# Patient Record
Sex: FEMALE | Race: WHITE | NOT HISPANIC OR LATINO | Employment: OTHER | URBAN - METROPOLITAN AREA
[De-identification: names, ages, dates, MRNs, and addresses within clinical notes are randomized per-mention and may not be internally consistent; named-entity substitution may affect disease eponyms.]

---

## 2021-09-17 ENCOUNTER — HOSPITAL ENCOUNTER (OUTPATIENT)
Dept: MAMMOGRAPHY | Facility: CLINIC | Age: 61
Discharge: HOME | End: 2021-09-17
Admitting: INTERNAL MEDICINE

## 2021-09-17 DIAGNOSIS — Z12.31 VISIT FOR SCREENING MAMMOGRAM: ICD-10-CM

## 2021-09-17 PROCEDURE — 77063 BREAST TOMOSYNTHESIS BI: CPT

## 2021-10-23 ENCOUNTER — HEALTH MAINTENANCE LETTER (OUTPATIENT)
Age: 61
End: 2021-10-23

## 2022-10-10 ENCOUNTER — HEALTH MAINTENANCE LETTER (OUTPATIENT)
Age: 62
End: 2022-10-10

## 2022-11-27 ENCOUNTER — HEALTH MAINTENANCE LETTER (OUTPATIENT)
Age: 62
End: 2022-11-27

## 2023-07-13 ENCOUNTER — OFFICE VISIT (OUTPATIENT)
Dept: FAMILY MEDICINE | Facility: CLINIC | Age: 63
End: 2023-07-13

## 2023-07-13 VITALS
WEIGHT: 118.8 LBS | BODY MASS INDEX: 21.86 KG/M2 | HEIGHT: 62 IN | SYSTOLIC BLOOD PRESSURE: 130 MMHG | DIASTOLIC BLOOD PRESSURE: 86 MMHG | OXYGEN SATURATION: 100 % | HEART RATE: 69 BPM

## 2023-07-13 DIAGNOSIS — E03.9 ACQUIRED HYPOTHYROIDISM: ICD-10-CM

## 2023-07-13 DIAGNOSIS — K51.80 OTHER ULCERATIVE COLITIS WITHOUT COMPLICATION (H): ICD-10-CM

## 2023-07-13 DIAGNOSIS — H93.13 TINNITUS, BILATERAL: Primary | ICD-10-CM

## 2023-07-13 DIAGNOSIS — L82.1 SEBORRHEIC KERATOSES: ICD-10-CM

## 2023-07-13 PROBLEM — K51.30 ULCERATIVE RECTOSIGMOIDITIS WITHOUT COMPLICATION (H): Status: RESOLVED | Noted: 2023-07-13 | Resolved: 2023-07-13

## 2023-07-13 PROBLEM — K51.90 ULCERATIVE COLITIS (H): Status: ACTIVE | Noted: 2023-07-13

## 2023-07-13 PROBLEM — K51.30 ULCERATIVE RECTOSIGMOIDITIS WITHOUT COMPLICATION (H): Status: ACTIVE | Noted: 2023-07-13

## 2023-07-13 PROBLEM — F40.243 FLYING PHOBIA: Status: ACTIVE | Noted: 2018-07-10

## 2023-07-13 PROCEDURE — 99203 OFFICE O/P NEW LOW 30 MIN: CPT | Performed by: FAMILY MEDICINE

## 2023-07-13 PROCEDURE — 36415 COLL VENOUS BLD VENIPUNCTURE: CPT | Performed by: FAMILY MEDICINE

## 2023-07-13 RX ORDER — VITAMIN B COMPLEX
4000 TABLET ORAL DAILY
COMMUNITY

## 2023-07-13 RX ORDER — LEVOTHYROXINE SODIUM 112 UG/1
TABLET ORAL
COMMUNITY
Start: 2022-08-22 | End: 2023-07-19

## 2023-07-13 NOTE — PROGRESS NOTES
"SUBJECTIVE:    Jane Vazquez, is a 63 year old female presenting for the below:     1. Hypothyroidism : Last TSH slightly low with higher T3. Synthroid 125 mcg daily for 1 month (dropped from 137 mcg prior). Tends to guide own labs (self pays for labs).  Highest dose 175 mcg. Dx at age 23.     2. Bilateral tinnitus : initially intermittent. 2 month, constant.  has commended on loss of hearing. 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     3. Skin concern : raised waxy lesion to left anterior shoulder    4. Ulcerative colitis. Receive treatment via functional medicine provider in [place] : received treatment for bacterial overgrowth with good effect. Has previously seen by  Dr Aram GUERRA. Aware overdue colonscopy and plans to do this with Aram GUERRA in the Fall.     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 Lived in [place] for 37 years. Enjoys walking.        OB/Gyn History:    Last Pap Smear: s/p hysterectomy.       OBJECTIVE:  Vitals:    07/13/23 0911   BP: 130/86   Pulse: 69   SpO2: 100%   Weight: 53.9 kg (118 lb 12.8 oz)   Height: 1.562 m (5' 1.5\")    Body mass index is 22.08 kg/m .  General: no acute distress, cooperative with exam.  Skin : raised circular lesion to left anterior shoulder. approx 4 mm in diameter. Waxy stuck on appearence.     ASSESSMENT / PLAN:      Tinnitus, bilateral  Proceed to formal audiology.  -     Adult Audiology  Referral - To [facility] Location; Future    Acquired hypothyroidism   Last TSH slightly low with higher T3. Synthroid 125 mcg daily for 1 month (dropped from 137 mcg prior). Due recheck.   -     TSH ([facility])  -     VENOUS COLLECTION    Seborrheic keratoses  Benign nature of lesion to left anterior shoulder discussed.     Other ulcerative colitis without complication (H)  Has previously seen by  Dr Aram GUERRA. Aware overdue colonscopy and plans to do this with Aram GUERRA " in the Fall.

## 2023-07-14 ENCOUNTER — TRANSFERRED RECORDS (OUTPATIENT)
Dept: FAMILY MEDICINE | Facility: CLINIC | Age: 63
End: 2023-07-14

## 2023-07-14 LAB — TSH BLD-ACNC: 1.23 UIU/ML (ref 0.45–4.5)

## 2023-07-19 DIAGNOSIS — E03.9 ACQUIRED HYPOTHYROIDISM: Primary | ICD-10-CM

## 2023-07-19 RX ORDER — LEVOTHYROXINE SODIUM 112 UG/1
TABLET ORAL
Qty: 30 TABLET | Refills: 1 | Status: SHIPPED | OUTPATIENT
Start: 2023-07-19 | End: 2023-09-29

## 2023-08-02 ENCOUNTER — TRANSFERRED RECORDS (OUTPATIENT)
Dept: FAMILY MEDICINE | Facility: CLINIC | Age: 63
End: 2023-08-02

## 2023-09-29 DIAGNOSIS — E03.9 ACQUIRED HYPOTHYROIDISM: Primary | ICD-10-CM

## 2023-09-29 RX ORDER — LEVOTHYROXINE SODIUM 125 UG/1
TABLET ORAL
Qty: 90 TABLET | Refills: 1 | Status: SHIPPED | OUTPATIENT
Start: 2023-09-29 | End: 2024-05-30

## 2023-09-29 NOTE — TELEPHONE ENCOUNTER
*LVM for pt clarification- refill request from pharm for Levothyroxine 112mcg but chart/note says pt has been taking 125mcg and should continue that dose. Need to clarify which dose pt taking/needs refilled. Refilled 112mcg dose on 7/19/23.    Patient returned call and says needs Levothyroxine 125mcg. LOV note mentions 125mcg dose but not listed on med list. Will pend refill for 125mcg  Mercedez Jiménez CMA on 9/29/2023 at 10:27 AM      Med: Levothyroxine     LOV (related): 7/13/23 with Dr. Caldwell     Last Lab: 7/13/23    Due for F/U around: not specified- due for cpx     Next Appt: None    TSH ([facility])  Dx: Acquired hypothyroidism            Component Ref Range & Units 2 mo ago    TSH 0.450 - 4.500 uIU/mL 1.230

## 2023-11-27 ENCOUNTER — OFFICE VISIT (OUTPATIENT)
Dept: FAMILY MEDICINE | Facility: CLINIC | Age: 63
End: 2023-11-27

## 2023-11-27 VITALS
HEART RATE: 69 BPM | DIASTOLIC BLOOD PRESSURE: 80 MMHG | BODY MASS INDEX: 22.05 KG/M2 | OXYGEN SATURATION: 100 % | WEIGHT: 118.6 LBS | SYSTOLIC BLOOD PRESSURE: 150 MMHG

## 2023-11-27 DIAGNOSIS — F40.243 FLYING PHOBIA: Primary | ICD-10-CM

## 2023-11-27 PROCEDURE — 99213 OFFICE O/P EST LOW 20 MIN: CPT | Performed by: FAMILY MEDICINE

## 2023-11-27 RX ORDER — LORAZEPAM 0.5 MG/1
0.5 TABLET ORAL DAILY PRN
Qty: 10 TABLET | Refills: 0 | Status: SHIPPED | OUTPATIENT
Start: 2023-11-27 | End: 2024-08-30

## 2023-11-27 ASSESSMENT — ANXIETY QUESTIONNAIRES
5. BEING SO RESTLESS THAT IT IS HARD TO SIT STILL: NOT AT ALL
1. FEELING NERVOUS, ANXIOUS, OR ON EDGE: NOT AT ALL
6. BECOMING EASILY ANNOYED OR IRRITABLE: NOT AT ALL
2. NOT BEING ABLE TO STOP OR CONTROL WORRYING: NOT AT ALL
GAD7 TOTAL SCORE: 0
GAD7 TOTAL SCORE: 0
3. WORRYING TOO MUCH ABOUT DIFFERENT THINGS: NOT AT ALL
7. FEELING AFRAID AS IF SOMETHING AWFUL MIGHT HAPPEN: NOT AT ALL

## 2023-11-27 ASSESSMENT — PATIENT HEALTH QUESTIONNAIRE - PHQ9
SUM OF ALL RESPONSES TO PHQ QUESTIONS 1-9: 0
5. POOR APPETITE OR OVEREATING: NOT AT ALL

## 2023-11-27 NOTE — PROGRESS NOTES
Subjective      is a 63 year old patient who presents to clinic for evaluation.  She has a flying phobia.  Historically has taken lorazepam only when she flies.  Has not used recently.  Her daughter has severe anxiety and was in the ER last night and she thinks this is why her BP is elevated.  She has no other concerns.        Review of Systems   Constitutional, HEENT, cardiovascular, pulmonary, GI, , musculoskeletal, neuro, skin, endocrine and psych systems are negative, except as otherwise noted.      Objective    BP (!) 150/80   Pulse 69   Wt 53.8 kg (118 lb 9.6 oz)   SpO2 100%   BMI 22.05 kg/m      General: Well appearing, NAD  Psych: normal mood and affect        No results found for this or any previous visit (from the past 24 hour(s)).    Flying phobia  Refill given. Proper use and potential benefits, harms and side effects discussed in detail.  PDMP reviewed.  Controlled substance agreement signed.    PDMP Review         Value Time User    State PDMP site checked  Yes 11/27/2023  3:56 PM Zbigniew Figueroa MD            - LORazepam (ATIVAN) 0.5 MG tablet; Take 1 tablet (0.5 mg) by mouth daily as needed for anxiety (flying)              Answers submitted by the patient for this visit:  General Questionnaire (Submitted on 11/27/2023)  Chief Complaint: Chronic problems general questions HPI Form  What is the reason for your visit today? : Need meds for flying possible thyroid check  How many servings of fruits and vegetables do you eat daily?: 2-3  On average, how many sweetened beverages do you drink each day (Examples: soda, juice, sweet tea, etc.  Do NOT count diet or artificially sweetened beverages)?: 0  How many minutes a day do you exercise enough to make your heart beat faster?: 30 to 60  How many days a week do you exercise enough to make your heart beat faster?: 6  How many days per week do you miss taking your medication?: 0

## 2023-11-27 NOTE — LETTER
[facility]  11/27/23  Patient: Jane Vazquez  YOB: 1960  Medical Record Number: [id number]                                                                                  Non-Opioid Controlled Substance Agreement    This is an agreement between you and your provider regarding safe and appropriate use of controlled substances prescribed by your care team. Controlled substances are?medicines that can cause physical and mental dependence (abuse).     There are strict laws about having and using these medicines. We here at [facility] are  committed to working with you in your efforts to get better. To support you in this work, we'll help you schedule regular office appointments for medicine refills. If we must cancel or change your appointment for any reason, we'll make sure you have enough medicine to last until your next appointment.     As a Provider, I will:   Listen carefully to your concerns while treating you with respect.   Recommend a treatment plan that I believe is in your best interest and may involve therapies other than medicine.    Talk with you often about the possible benefits and the risk of harm of any medicine that we prescribe for you.  Assess the safety of this medicine and check how well it works.    Provide a plan on how to taper (discontinue or go off) using this medicine if the decision is made to stop its use.      ::  As a Patient, I understand controlled substances:     Are prescribed by my care provider to help me function or work and manage my condition(s).?  Are strong medicines and can cause serious side effects.     Need to be taken exactly as prescribed.?Combining controlled substances with certain medicines or chemicals (such as illegal drugs, alcohol, sedatives, sleeping pills, and benzodiazepines) can be dangerous or even fatal.? If I stop taking my medicines suddenly, I may have severe withdrawal symptoms.     The risks, benefits, and side effects  of these medicine(s) were explained to me. I agree that:    I will take part in other treatments as advised by my care team. This may be psychiatry or counseling, physical therapy, behavioral therapy, group treatment or a referral to specialist.    I will keep all my appointments and understand this is part of the monitoring of controlled substances.?My care team may require an office visit for EVERY controlled substance refill. If I miss appointments or don t follow instructions, my care team may stop my medicine    I will take my medicines as prescribed. I will not change the dose or schedule unless my care team tells me to. There will be no refills if I run out early.      I may be asked to come to the clinic and complete a urine drug test or complete a pill count. If I don t give a urine sample or participate in a pill count, the care team may stop my medicine.    I will only receive controlled substance prescriptions from this clinic. If I am treated by another provider, I will tell them that I am taking controlled substances and that I have a treatment agreement with this provider. I will inform my [facility] care team within one business day if I am given a prescription for any controlled substance by another healthcare provider. My [facility] care team can contact other providers and pharmacists about my use of any medicines.    It is up to me to make sure that I don't run out of my medicines on weekends or holidays.?If my care team is willing to refill my prescription without a visit, I must request refills only during office hours. Refills may take up to 3 business days to process. I will use one pharmacy to fill all my controlled substance prescriptions. I will notify the clinic about any changes to my insurance or medicine availability.    I am responsible for my prescriptions. If the medicine/prescription is lost, stolen or destroyed, it will not be replaced.?I also agree not to share  controlled substance medicines with anyone.     I am aware I should not use any illegal or recreational drugs. I agree not to drink alcohol unless my care team says I can.     If I enroll in the [place] Medical Cannabis program, I will tell my care team before my next refill.    I will tell my care team right away if I become pregnant, have a new medical problem treated outside of my regular clinic, or have a change in my medicines.     I understand that this medicine can affect my thinking, judgment and reaction time.? Alcohol and drugs affect the brain and body, which can affect the safety of my driving. Being under the influence of alcohol or drugs can affect my decision-making, behaviors, personal safety and the safety of others. Driving while impaired (DWI) can occur if a person is driving, operating or in physical control of a car, motorcycle, boat, snowmobile, ATV, motorbike, off-road vehicle or any other motor vehicle ([place] Statute 169A.20). I understand the risk if I choose to drive or operate any vehicle or machinery.    I understand that if I do not follow any of the conditions above, my prescriptions or treatment may be stopped or changed.   I agree that my provider, clinic care team and pharmacy may work with any city, state or federal law enforcement agency that investigates the misuse, sale or other diversion of my controlled medicine. I will allow my provider to discuss my care with, or share a copy of, this agreement with any other treating provider, pharmacy or emergency room where I receive care.     I have read this agreement and have asked questions about anything I did not understand.    ________________________________________________________  Patient Signature - Jane Vazquez     ___________________                   Date     ________________________________________________________  Provider Signature - Zbigniew Figueroa MD       ___________________                   Date      ________________________________________________________  Witness Signature (required if provider not present while patient signing)          ___________________                   Date

## 2023-12-21 ENCOUNTER — HOSPITAL ENCOUNTER (OUTPATIENT)
Dept: CT IMAGING | Facility: CLINIC | Age: 63
Discharge: HOME | End: 2023-12-21
Attending: FAMILY MEDICINE | Admitting: FAMILY MEDICINE

## 2023-12-21 ENCOUNTER — OFFICE VISIT (OUTPATIENT)
Dept: FAMILY MEDICINE | Facility: CLINIC | Age: 63
End: 2023-12-21

## 2023-12-21 VITALS
BODY MASS INDEX: 21.67 KG/M2 | HEART RATE: 66 BPM | OXYGEN SATURATION: 100 % | WEIGHT: 116.6 LBS | SYSTOLIC BLOOD PRESSURE: 152 MMHG | TEMPERATURE: 97.9 F | DIASTOLIC BLOOD PRESSURE: 72 MMHG

## 2023-12-21 DIAGNOSIS — R10.32 LEFT LOWER QUADRANT PAIN: Primary | ICD-10-CM

## 2023-12-21 DIAGNOSIS — K51.80 OTHER ULCERATIVE COLITIS WITHOUT COMPLICATION (H): ICD-10-CM

## 2023-12-21 DIAGNOSIS — R10.32 LEFT LOWER QUADRANT PAIN: ICD-10-CM

## 2023-12-21 LAB
% GRANULOCYTES: 64.9 % (ref 42.2–75.2)
ANION GAP SERPL CALCULATED.3IONS-SCNC: 10 MMOL/L (ref 7–15)
BUN SERPL-MCNC: 17.9 MG/DL (ref 8–23)
CALCIUM SERPL-MCNC: 10 MG/DL (ref 8.8–10.2)
CHLORIDE SERPL-SCNC: 105 MMOL/L (ref 98–107)
CREAT SERPL-MCNC: 0.86 MG/DL (ref 0.51–0.95)
DEPRECATED HCO3 PLAS-SCNC: 29 MMOL/L (ref 22–29)
EGFRCR SERPLBLD CKD-EPI 2021: 75 ML/MIN/1.73M2
GLUCOSE SERPL-MCNC: 98 MG/DL (ref 70–99)
HCT VFR BLD AUTO: 39.5 % (ref 35–46)
HEMOGLOBIN: 13.6 G/DL (ref 11.8–15.5)
LYMPHOCYTES NFR BLD AUTO: 27.6 % (ref 20.5–51.1)
MCH RBC QN AUTO: 31.5 PG (ref 27–31)
MCHC RBC AUTO-ENTMCNC: 34.4 G/DL (ref 33–37)
MCV RBC AUTO: 91.5 FL (ref 80–100)
MONOCYTES NFR BLD AUTO: 7.5 % (ref 1.7–9.3)
PLATELET # BLD AUTO: 245 K/UL (ref 140–450)
POTASSIUM SERPL-SCNC: 4.1 MMOL/L (ref 3.4–5.3)
RBC # BLD AUTO: 4.31 X10/CMM (ref 3.7–5.2)
SODIUM SERPL-SCNC: 144 MMOL/L (ref 135–145)
WBC # BLD AUTO: 4.8 X10/CMM (ref 3.8–11)

## 2023-12-21 PROCEDURE — 36415 COLL VENOUS BLD VENIPUNCTURE: CPT | Performed by: FAMILY MEDICINE

## 2023-12-21 PROCEDURE — 85025 COMPLETE CBC W/AUTO DIFF WBC: CPT | Performed by: FAMILY MEDICINE

## 2023-12-21 PROCEDURE — 80048 BASIC METABOLIC PNL TOTAL CA: CPT | Performed by: FAMILY MEDICINE

## 2023-12-21 PROCEDURE — 74177 CT ABD & PELVIS W/CONTRAST: CPT

## 2023-12-21 PROCEDURE — 99214 OFFICE O/P EST MOD 30 MIN: CPT | Performed by: FAMILY MEDICINE

## 2023-12-21 PROCEDURE — 250N000009 HC RX 250: Performed by: FAMILY MEDICINE

## 2023-12-21 PROCEDURE — 250N000011 HC RX IP 250 OP 636: Performed by: FAMILY MEDICINE

## 2023-12-21 RX ORDER — IOPAMIDOL 755 MG/ML
79 INJECTION, SOLUTION INTRAVASCULAR ONCE
Status: COMPLETED | OUTPATIENT
Start: 2023-12-21 | End: 2023-12-21

## 2023-12-21 RX ADMIN — IOPAMIDOL 79 ML: 755 INJECTION, SOLUTION INTRAVENOUS at 17:39

## 2023-12-21 RX ADMIN — SODIUM CHLORIDE 63 ML: 9 INJECTION, SOLUTION INTRAVENOUS at 17:40

## 2023-12-21 NOTE — PROGRESS NOTES
SUBJECTIVE:    Jane Vazquez, is a 63 year old female presenting for the below:     1. 2 week h/o nausea (constant) associated with lower back ache (lower bilateral, constant , 5/10 in severity). Some decreased appetite: eating causing nausea. Eating toast and saltines. Mild unintentional weight loss.   Symptoms initially started shortly after returning from [place] with sore throat for 24 hours and flu like symptoms which has now resolved.     No vomiting. 1 x episode of loose stool last week. No improvement with Mylanta. No fevers or chills. No GERD. No shortness of breath, no chest pain. Denies mucus, hemtochezia or melena.     Known ulcerative colitis. Feels different from prior flares. Has been in remission for 5 years at least. No urinary symptoms.     OBJECTIVE:  Vitals:    12/21/23 1449   BP: (!) 152/72   Pulse: 66   Temp: 97.9  F (36.6  C)   TempSrc: Oral   SpO2: 100%   Weight: 52.9 kg (116 lb 9.6 oz)    Body mass index is 21.67 kg/m .    General: no acute distress, cooperative with exam, slightly tired looking.   Ears: TM's and canals show no erythema, discharge, or effusion bilaterally.  Lungs: clear to auscultation bilaterally, normal respiratory effort.  Heart: regular rate, normal S1 and S2, no murmurs.   Abdomen:  tender to palpation and percussion with guarding left iliac fossa.  no palpable organomegaly. No rebound.       Wt Readings from Last 3 Encounters:   12/21/23 52.9 kg (116 lb 9.6 oz)   11/27/23 53.8 kg (118 lb 9.6 oz)   07/13/23 53.9 kg (118 lb 12.8 oz)     Colonoscopy 6/2020      ASSESSMENT / PLAN:      Left lower quadrant pain  Other ulcerative colitis without complication (H)  Significant findings on abdominal exam. Concern for ulcerative colitis flare vs diverticulitis. Proceed to CT abdomen / pelvis. Read and call result.   -     CBC with Diff/Plt ([facility])  -     Basic metabolic panel; Future  -     CT Abdomen Pelvis w Contrast; Future

## 2023-12-21 NOTE — PROGRESS NOTES
CT abd/pelvis scheduled for today at [facility]. Check in at 510pm for 540pm appt.   Requested stat read and call to Dr. Caldwell's cell number.   Patient informed and verbalizes understanding.   Lydia Boateng RN

## 2024-01-07 ENCOUNTER — HEALTH MAINTENANCE LETTER (OUTPATIENT)
Age: 64
End: 2024-01-07

## 2024-05-10 ENCOUNTER — HOSPITAL ENCOUNTER (OUTPATIENT)
Dept: MAMMOGRAPHY | Facility: CLINIC | Age: 64
Discharge: HOME | End: 2024-05-10
Attending: FAMILY MEDICINE | Admitting: FAMILY MEDICINE
Payer: MEDICARE

## 2024-05-10 DIAGNOSIS — Z12.31 VISIT FOR SCREENING MAMMOGRAM: ICD-10-CM

## 2024-05-10 PROCEDURE — 77063 BREAST TOMOSYNTHESIS BI: CPT

## 2024-05-26 ENCOUNTER — HEALTH MAINTENANCE LETTER (OUTPATIENT)
Age: 64
End: 2024-05-26

## 2024-05-30 DIAGNOSIS — E03.9 ACQUIRED HYPOTHYROIDISM: ICD-10-CM

## 2024-05-30 RX ORDER — LEVOTHYROXINE SODIUM 125 UG/1
TABLET ORAL
Qty: 90 TABLET | Refills: 1 | Status: SHIPPED | OUTPATIENT
Start: 2024-05-30 | End: 2024-08-08

## 2024-05-30 NOTE — TELEPHONE ENCOUNTER
"Med: Synthroid     LOV (related): 7/13/23    Last Lab: 7/13/23      Due for F/U around:  due for CPX      Next Appt: No current future appointments scheduled         No results found for: \"TSH\", \"T4\"    "

## 2024-08-05 SDOH — HEALTH STABILITY: PHYSICAL HEALTH: ON AVERAGE, HOW MANY MINUTES DO YOU ENGAGE IN EXERCISE AT THIS LEVEL?: 40 MIN

## 2024-08-05 SDOH — HEALTH STABILITY: PHYSICAL HEALTH: ON AVERAGE, HOW MANY DAYS PER WEEK DO YOU ENGAGE IN MODERATE TO STRENUOUS EXERCISE (LIKE A BRISK WALK)?: 7 DAYS

## 2024-08-05 ASSESSMENT — SOCIAL DETERMINANTS OF HEALTH (SDOH): HOW OFTEN DO YOU GET TOGETHER WITH FRIENDS OR RELATIVES?: THREE TIMES A WEEK

## 2024-08-06 NOTE — PATIENT INSTRUCTIONS
Patient Education   Preventive Care Advice   This is general advice given by our system to help you stay healthy. However, your care team may have specific advice just for you. Please talk to your care team about your preventive care needs.  Nutrition  Eat 5 or more servings of fruits and vegetables each day.  Try wheat bread, brown rice and whole grain pasta (instead of white bread, rice, and pasta).  Get enough calcium and vitamin D. Check the label on foods and aim for 100% of the RDA (recommended daily allowance).  Lifestyle  Exercise at least 150 minutes each week  (30 minutes a day, 5 days a week).  Do muscle strengthening activities 2 days a week. These help control your weight and prevent disease.  No smoking.  Wear sunscreen to prevent skin cancer.  Have a dental exam and cleaning every 6 months.  Yearly exams  See your health care team every year to talk about:  Any changes in your health.  Any medicines your care team has prescribed.  Preventive care, family planning, and ways to prevent chronic diseases.  Shots (vaccines)   HPV shots (up to age 26), if you've never had them before.  Hepatitis B shots (up to age 59), if you've never had them before.  COVID-19 shot: Get this shot when it's due.  Flu shot: Get a flu shot every year.  Tetanus shot: Get a tetanus shot every 10 years.  Pneumococcal, hepatitis A, and RSV shots: Ask your care team if you need these based on your risk.  Shingles shot (for age 50 and up)  General health tests  Diabetes screening:  Starting at age 35, Get screened for diabetes at least every 3 years.  If you are younger than age 35, ask your care team if you should be screened for diabetes.  Cholesterol test: At age 39, start having a cholesterol test every 5 years, or more often if advised.  Bone density scan (DEXA): At age 50, ask your care team if you should have this scan for osteoporosis (brittle bones).  Hepatitis C: Get tested at least once in your life.  STIs (sexually  transmitted infections)  Before age 24: Ask your care team if you should be screened for STIs.  After age 24: Get screened for STIs if you're at risk. You are at risk for STIs (including HIV) if:  You are sexually active with more than one person.  You don't use condoms every time.  You or a partner was diagnosed with a sexually transmitted infection.  If you are at risk for HIV, ask about PrEP medicine to prevent HIV.  Get tested for HIV at least once in your life, whether you are at risk for HIV or not.  Cancer screening tests  Cervical cancer screening: If you have a cervix, begin getting regular cervical cancer screening tests starting at age 21.  Breast cancer scan (mammogram): If you've ever had breasts, begin having regular mammograms starting at age 40. This is a scan to check for breast cancer.  Colon cancer screening: It is important to start screening for colon cancer at age 45.  Have a colonoscopy test every 10 years (or more often if you're at risk) Or, ask your provider about stool tests like a FIT test every year or Cologuard test every 3 years.  To learn more about your testing options, visit:   .  For help making a decision, visit:   https://bit.ly/fg81745.  Prostate cancer screening test: If you have a prostate, ask your care team if a prostate cancer screening test (PSA) at age 55 is right for you.  Lung cancer screening: If you are a current or former smoker ages 50 to 80, ask your care team if ongoing lung cancer screenings are right for you.  For informational purposes only. Not to replace the advice of your health care provider. Copyright   2023 [employer]. All rights reserved. Clinically reviewed by the [facility] Transitions Program. [employer] 915881 - REV 01/24.

## 2024-08-08 ENCOUNTER — OFFICE VISIT (OUTPATIENT)
Dept: FAMILY MEDICINE | Facility: CLINIC | Age: 64
End: 2024-08-08

## 2024-08-08 VITALS
HEIGHT: 62 IN | HEART RATE: 70 BPM | BODY MASS INDEX: 21.6 KG/M2 | SYSTOLIC BLOOD PRESSURE: 115 MMHG | OXYGEN SATURATION: 100 % | DIASTOLIC BLOOD PRESSURE: 63 MMHG | WEIGHT: 117.4 LBS

## 2024-08-08 DIAGNOSIS — E03.9 ACQUIRED HYPOTHYROIDISM: ICD-10-CM

## 2024-08-08 DIAGNOSIS — E78.2 MIXED HYPERLIPIDEMIA: ICD-10-CM

## 2024-08-08 DIAGNOSIS — K51.80 OTHER ULCERATIVE COLITIS WITHOUT COMPLICATION (H): ICD-10-CM

## 2024-08-08 DIAGNOSIS — Z00.00 ENCOUNTER FOR MEDICARE ANNUAL WELLNESS EXAM: Primary | ICD-10-CM

## 2024-08-08 DIAGNOSIS — Z12.12 SCREENING FOR COLORECTAL CANCER: ICD-10-CM

## 2024-08-08 DIAGNOSIS — Z12.11 SCREENING FOR COLORECTAL CANCER: ICD-10-CM

## 2024-08-08 DIAGNOSIS — R22.1 NECK MASS: ICD-10-CM

## 2024-08-08 DIAGNOSIS — Z78.0 POST-MENOPAUSAL: ICD-10-CM

## 2024-08-08 LAB
CHOLESTEROL: 276 MG/DL (ref 100–199)
FASTING?: YES
HDL (RMG): 80 MG/DL (ref 40–?)
LDL CALCULATED (RMG): 181 MG/DL (ref 0–130)
TRIGLYCERIDES (RMG): 77 MG/DL (ref 0–149)
TSH SERPL DL<=0.005 MIU/L-ACNC: 0.7 UIU/ML (ref 0.3–4.2)

## 2024-08-08 PROCEDURE — 36415 COLL VENOUS BLD VENIPUNCTURE: CPT | Performed by: FAMILY MEDICINE

## 2024-08-08 PROCEDURE — 99214 OFFICE O/P EST MOD 30 MIN: CPT | Mod: 25 | Performed by: FAMILY MEDICINE

## 2024-08-08 PROCEDURE — 80061 LIPID PANEL: CPT | Mod: QW | Performed by: FAMILY MEDICINE

## 2024-08-08 PROCEDURE — 84443 ASSAY THYROID STIM HORMONE: CPT | Mod: 90 | Performed by: FAMILY MEDICINE

## 2024-08-08 PROCEDURE — G0402 INITIAL PREVENTIVE EXAM: HCPCS | Performed by: FAMILY MEDICINE

## 2024-08-08 RX ORDER — LEVOTHYROXINE SODIUM 125 UG/1
TABLET ORAL
Qty: 90 TABLET | Refills: 3 | Status: SHIPPED | OUTPATIENT
Start: 2024-08-08 | End: 2024-09-09

## 2024-08-08 RX ORDER — OMEGA-3S/DHA/EPA/FISH OIL 1000-1400
CAPSULE,DELAYED RELEASE (ENTERIC COATED) ORAL
COMMUNITY

## 2024-08-08 NOTE — PROGRESS NOTES
8/5/2024   General Health   How would you rate your overall physical health? Good   Feel stress (tense, anxious, or unable to sleep) Only a little      (!) STRESS CONCERN      8/5/2024   Nutrition   Diet: Regular (no restrictions)            8/5/2024   Exercise   Days per week of moderate/strenous exercise 7 days   Average minutes spent exercising at this level 40 min            8/5/2024   Social Factors   Frequency of gathering with friends or relatives Three times a week   Worry food won't last until get money to buy more No   Food not last or not have enough money for food? No   Do you have housing? (Housing is defined as stable permanent housing and does not include staying ouside in a car, in a tent, in an abandoned building, in an overnight shelter, or couch-surfing.) Yes   Are you worried about losing your housing? No   Lack of transportation? No   Unable to get utilities (heat,electricity)? No            8/5/2024   Fall Risk   Fallen 2 or more times in the past year? No   Trouble with walking or balance? No             8/5/2024   Activities of Daily Living- Home Safety   Needs help with the following daily activites None of the above   Safety concerns in the home None of the above            8/5/2024   Dental   Dentist two times every year? (!) NO            8/5/2024   Hearing Screening   Hearing concerns? None of the above            8/5/2024   Driving Risk Screening   Patient/family members have concerns about driving No            8/5/2024   General Alertness/Fatigue Screening   Have you been more tired than usual lately? No            8/5/2024   Urinary Incontinence Screening   Bothered by leaking urine in past 6 months No            8/5/2024   TB Screening   Were you born outside of the [place]? No      HEARING SCREEN: Wears hearing aids.      Today's PHQ-2 Score:       8/5/2024     9:33 AM   PHQ-2 ( 1999 [employer])   Q1: Little interest or pleasure in doing things 0   Q2: Feeling down, depressed or  hopeless 0   PHQ-2 Score 0   Q1: Little interest or pleasure in doing things Not at all   Q2: Feeling down, depressed or hopeless Not at all   PHQ-2 Score 0           8/5/2024   Substance Use   Alcohol more than 3/day or more than 7/wk No   Do you have a current opioid prescription? No   How severe/bad is pain from 1 to 10? 0/10 (No Pain)   Do you use any other substances recreationally? No        Social History     Tobacco Use    Smoking status: Never    Smokeless tobacco: Never         8/5/2024   Breast Cancer Screening   Family history of breast, colon, or ovarian cancer? No / Unknown          5/10/2024   LAST FHS-7 RESULTS   1st degree relative breast or ovarian cancer No   Any relative bilateral breast cancer No   Any male have breast cancer No   Any ONE woman have BOTH breast AND ovarian cancer No   Any woman with breast cancer before 50yrs No   2 or more relatives with breast AND/OR ovarian cancer No   2 or more relatives with breast AND/OR bowel cancer No        The following health maintenance items are reviewed in Epic and correct as of today:  Health Maintenance   Topic Date Due    DEXA  Never done    LIPID  Never done    ADVANCE CARE PLANNING  Never done    ZOSTER IMMUNIZATION (1 of 2) Never done    RSV VACCINE (Pregnancy & 60+) (1 - 1-dose 60+ series) Never done    COLORECTAL CANCER SCREENING  06/26/2022    COVID-19 Vaccine (1 - 2023-24 season) Never done    Pneumococcal Vaccine: 65+ Years (1 of 1 - PCV) 04/15/2024    TSH W/FREE T4 REFLEX  07/13/2024    INFLUENZA VACCINE (1) 09/01/2024    DTAP/TDAP/TD IMMUNIZATION (2 - Td or Tdap) 01/30/2025    MEDICARE ANNUAL WELLNESS VISIT  08/08/2025    FALL RISK ASSESSMENT  08/08/2025    MAMMO SCREENING  05/10/2026    GLUCOSE  12/21/2026    PHQ-2 (once per calendar year)  Completed    IPV IMMUNIZATION  Aged Out    HPV IMMUNIZATION  Aged Out    MENINGITIS IMMUNIZATION  Aged Out    RSV MONOCLONAL ANTIBODY  Aged Out    HEPATITIS C SCREENING  Discontinued    HIV  SCREENING  Discontinued     Mini-Cog Assessment:  Mini Cog Assessment  Clock Draw Score: 2 Normal  3 Item Recall: 3 objects recalled  Mini Cog Total Score: 5    Mini-Cog Assessment Score: Mini Cog Total Score: 5/5.         Encounter for Medicare annual wellness exam

## 2024-08-08 NOTE — PROGRESS NOTES
"Female Preventive Health Visit    SUBJECTIVE:    This 64 year old female presents for a routine preventive physical exam.    The patient has the following concerns:     1. Feeling of fullness left side of neck. 1 year. No pain. Fluctuates in size. Non tender.     Patient's medications, allergies, past medical, surgical and family histories were reviewed and updated as appropriate.    OB/Gyn History:    Last Pap Smear: s/p hysterectomy.     Health Maintenance:  Health maintenance alerts were reviewed and updated as appropriate.  Breast cancer screening: UTD  Osteoporosis screening: due  Colorectal cancer screening: over due 2 year follow up       OBJECTIVE:  Vitals:    08/08/24 1329   BP: 115/63   Pulse: 70   SpO2: 100%   Weight: 53.3 kg (117 lb 6.4 oz)   Height: 1.562 m (5' 1.5\")    Body mass index is 21.82 kg/m .  General: no acute distress, cooperative with exam.  HEENT:  PERRLA. Bilateral TM's, external canals, oropharynx normal.    Neck:  Supple, no lymphadenopathy or thyromegaly. Area patient indicates awareness of corresponds to left carotid artery (pulsatile). Slightly more prominent to right side.    Lungs: clear to auscultation bilaterally, normal respiratory effort.  Heart:  RRR without murmurs, rubs or gallops.  Normal S1 and S2.  Abdomen: normal bowel sounds, nontender, no palpable organomegaly.  Skin:  No lesions.  No rashes.  Extremities: warm, perfused, no swelling or edema.  Neuro:  CN II-XII intact, motor & sensory function all intact.    Psych: mental status normal, mood and affect appropriate.        11/27/2023     4:14 PM   PHQ   PHQ-9 Total Score 0   Q9: Thoughts of better off dead/self-harm past 2 weeks Not at all       ASSESSMENT / PLAN:  This 64 year old female presents for a routine preventive physical exam. Preventive health topics discussed including adequate exercise and healthy diet. Return to clinic in one year for preventative exam or sooner with any other concerns.  Other issues " discussed as noted below.      Other ulcerative colitis without complication (H)  Screening for colorectal cancer   Ulcerative colitis. Receive treatment via functional medicine provider in [place] : received treatment for bacterial overgrowth with good effect. Has previously seen by  Dr Aram GUERRA.   -     Colonoscopy Screening  Referral; Future    Acquired hypothyroidism  Synthroid 175 mcg. Dx at age 23.   -     TSH with free T4 reflex; Future  -     VENOUS COLLECTION    Mixed hyperlipidemia  Recent insurance labs with high total cholesterol, non fasting. Would like to recheck today fasting.   -     Lipid Profile ([facility])    Post-menopausal  -     DX Bone Density; Future    Neck mass   Area patient indicates awareness of corresponds to left carotid artery (pulsatile). USS imaging to rule out aneurysmal change.   -     US Carotid Bilateral; Future

## 2024-08-09 ENCOUNTER — HOSPITAL ENCOUNTER (OUTPATIENT)
Dept: ULTRASOUND IMAGING | Facility: CLINIC | Age: 64
Discharge: HOME | End: 2024-08-09
Attending: FAMILY MEDICINE | Admitting: FAMILY MEDICINE
Payer: MEDICARE

## 2024-08-09 DIAGNOSIS — R22.1 NECK MASS: ICD-10-CM

## 2024-08-09 PROCEDURE — 93880 EXTRACRANIAL BILAT STUDY: CPT

## 2024-08-11 PROBLEM — I65.23 BILATERAL CAROTID ARTERY STENOSIS: Status: ACTIVE | Noted: 2024-08-11

## 2024-08-30 DIAGNOSIS — F40.243 FLYING PHOBIA: ICD-10-CM

## 2024-08-30 RX ORDER — LORAZEPAM 0.5 MG/1
0.5 TABLET ORAL DAILY PRN
Qty: 10 TABLET | Refills: 0 | Status: SHIPPED | OUTPATIENT
Start: 2024-08-30

## 2024-08-30 NOTE — TELEPHONE ENCOUNTER
Patient called clinic requesting prescription for lorazepam 0.5mg tab, takes for fear of flying. Last office visit in clinic 8/8/24 for CPX and last prescription 11/23/24 from Dr Figueroa. Prescription entered and routed to Dr Caldwell for review. Jessica DUENAS  fill history below:

## 2024-09-09 ENCOUNTER — OFFICE VISIT (OUTPATIENT)
Dept: FAMILY MEDICINE | Facility: CLINIC | Age: 64
End: 2024-09-09

## 2024-09-09 ENCOUNTER — ANCILLARY PROCEDURE (OUTPATIENT)
Dept: BONE DENSITY | Facility: CLINIC | Age: 64
End: 2024-09-09
Attending: FAMILY MEDICINE
Payer: MEDICARE

## 2024-09-09 VITALS
DIASTOLIC BLOOD PRESSURE: 85 MMHG | WEIGHT: 118 LBS | SYSTOLIC BLOOD PRESSURE: 147 MMHG | OXYGEN SATURATION: 99 % | HEART RATE: 71 BPM | TEMPERATURE: 98.7 F | BODY MASS INDEX: 21.93 KG/M2

## 2024-09-09 DIAGNOSIS — E03.9 ACQUIRED HYPOTHYROIDISM: ICD-10-CM

## 2024-09-09 DIAGNOSIS — J01.00 ACUTE NON-RECURRENT MAXILLARY SINUSITIS: Primary | ICD-10-CM

## 2024-09-09 PROCEDURE — 99213 OFFICE O/P EST LOW 20 MIN: CPT

## 2024-09-09 PROCEDURE — G2211 COMPLEX E/M VISIT ADD ON: HCPCS

## 2024-09-09 PROCEDURE — 77080 DXA BONE DENSITY AXIAL: CPT | Mod: TC | Performed by: PHYSICIAN ASSISTANT

## 2024-09-09 RX ORDER — LEVOTHYROXINE SODIUM 125 UG/1
TABLET ORAL
Qty: 90 TABLET | Refills: 3 | Status: SHIPPED | OUTPATIENT
Start: 2024-09-09

## 2024-09-09 RX ORDER — MOMETASONE FUROATE MONOHYDRATE 50 UG/1
2 SPRAY, METERED NASAL DAILY
Qty: 17 G | Refills: 0 | Status: SHIPPED | OUTPATIENT
Start: 2024-09-09

## 2024-09-09 NOTE — PROGRESS NOTES
Assessment & Plan     Acute non-recurrent maxillary sinusitis  - educated most sinus infections are viral, given not improving despite home remedies and tenderness over sinuses will treat for bacterial infection, did advise patient this could be COVID given recent travel from [place].   - Education about adverse effects of prescription medication discussed  -Patient verbalized understanding and is agreeable to the discussed plan of care.  - Red flags that warrant emergent evaluation discussed    - amoxicillin-clavulanate (AUGMENTIN) 875-125 MG tablet  Dispense: 14 tablet; Refill: 0  - mometasone (NASONEX) 50 MCG/ACT nasal spray  Dispense: 17 g; Refill: 0    Acquired hypothyroidism  - refill provided   - levothyroxine (SYNTHROID/LEVOTHROID) 125 MCG tablet  Dispense: 90 tablet; Refill: 3              See Patient Instructions    Return in about 3 days (around 9/12/2024), or if symptoms worsen or fail to improve, for Follow up.    Subjective    is a 64 year old, presenting for the following health issues:  URI (Sinus congestion for 3 weeks. Sore eyes, ear pressure, headache, cough/Denies SOB, chest pain, body ache, fever, rash/Had sinus infection in Jan)    HPI     1.) sinus complaint: pressure around eyes and in ears with headache. Mild cough.  All started about 3 weeks ago and is staying the same.  She has tried mucinex, nasal rinses, and advil and showering at home for remedy with mild-moderate relief. Also has been taking zyrtec and cetirizine.        Review of Systems  Constitutional, HEENT, cardiovascular, pulmonary, gi and gu systems are negative, except as otherwise noted.      Objective    BP (!) 147/85   Pulse 71   Temp 98.7  F (37.1  C)   Wt 53.5 kg (118 lb)   SpO2 99%   BMI 21.93 kg/m    Body mass index is 21.93 kg/m .  Physical Exam   GENERAL: alert and no distress  EYES: Eyes grossly normal to inspection, PERRL and conjunctivae and sclerae normal  HENT: normal cephalic/atraumatic, both ears:  clear effusion and erythematous, nose and mouth without ulcers or lesions, nasal mucosa edematous , rhinorrhea white, oropharynx clear, and oral mucous membranes moist  NECK: no adenopathy, no asymmetry, masses, or scars  RESP: lungs clear to auscultation - no rales, rhonchi or wheezes  CV: regular rate and rhythm, normal S1 S2, no S3 or S4, no murmur, click or rub, no peripheral edema  MS: no gross musculoskeletal defects noted, no edema  SKIN: no suspicious lesions or rashes    Results for orders placed or performed in visit on 08/21/24 (from the past 24 hour(s))   DX Bone Density    Narrative    EXAM: DX AXIAL HIPS/SPINE  LOCATION: [facility]  DATE: 9/9/2024    INDICATION: BMD screening, baseline.  DEMOGRAPHICS: Age- 64 years. Gender- Female. Menopausal status- Postmenopausal.  COMPARISON: No prior studies available on the current scanner.  TECHNIQUE: Dual-energy x-ray absorptiometry (DXA) performed with routine technique.     FINDINGS:    DXA RESULTS  -Lumbar Spine: L1-L4: BMD: 0.870 g/cm2. T-score: -2.6. Z-score: -1.0.  -RIGHT Hip Total: BMD: 0.820 g/cm2. T-score: -1.5. Z-score: -0.3.  -RIGHT Hip Femoral neck: BMD: 0.830 g/cm2. T-score: -1.5. Z-score: 0.0.  -LEFT Hip Total: BMD: 0.839 g/cm2. T-score: -1.3. Z-score: -0.1.  -LEFT Hip Femoral neck: BMD: 0.772 g/cm2. T-score: -1.9. Z-score: -0.4.    WHO T-SCORE CRITERIA  -Normal: T score at or above -1 SD  -Osteopenia: T score between -1 and -2.5 SD  -Osteoporosis: T score at or below -2.5 SD    The World Health Organization (WHO) criteria is applicable to perimenopausal females, postmenopausal females, and men aged 50 years or older.    FRACTURE RISK  -The FRAX risk calculator is not applicable due to osteoporosis.    RECOMMENDATIONS  The patient's BMD is consistent with osteoporosis, and he/she is at increased fracture risk. If not currently being treated for low BMD, this would merit treatment according to the Bone Health and  Osteoporosis Foundation.      Impression    IMPRESSION: OSTEOPOROSIS. T score meets the WHO criteria for osteoporosis at one or more measured sites. The risk of osteoporotic fracture increases approximately two-fold for each standard deviation decrease in T-score.            Signed Electronically by: DOMINGUEZ Kramer CNP    The longitudinal plan of care for the diagnosis(es)/condition(s) as documented were addressed during this visit. Due to the added complexity in care, I will continue to support  in the subsequent management and with ongoing continuity of care.

## 2024-09-12 ENCOUNTER — OFFICE VISIT (OUTPATIENT)
Dept: FAMILY MEDICINE | Facility: CLINIC | Age: 64
End: 2024-09-12

## 2024-09-12 VITALS
HEART RATE: 61 BPM | SYSTOLIC BLOOD PRESSURE: 131 MMHG | WEIGHT: 118 LBS | DIASTOLIC BLOOD PRESSURE: 84 MMHG | OXYGEN SATURATION: 100 % | BODY MASS INDEX: 21.93 KG/M2

## 2024-09-12 DIAGNOSIS — M81.0 OSTEOPOROSIS, UNSPECIFIED OSTEOPOROSIS TYPE, UNSPECIFIED PATHOLOGICAL FRACTURE PRESENCE: Primary | ICD-10-CM

## 2024-09-12 DIAGNOSIS — E78.2 MIXED HYPERLIPIDEMIA: ICD-10-CM

## 2024-09-12 PROCEDURE — 99214 OFFICE O/P EST MOD 30 MIN: CPT | Performed by: FAMILY MEDICINE

## 2024-09-12 PROCEDURE — G2211 COMPLEX E/M VISIT ADD ON: HCPCS | Performed by: FAMILY MEDICINE

## 2024-09-12 RX ORDER — ALENDRONATE SODIUM 70 MG/1
70 TABLET ORAL
Qty: 12 TABLET | Refills: 3 | Status: SHIPPED | OUTPATIENT
Start: 2024-09-12

## 2024-09-12 RX ORDER — ROSUVASTATIN CALCIUM 20 MG/1
20 TABLET, COATED ORAL DAILY
Qty: 90 TABLET | Refills: 3 | Status: SHIPPED | OUTPATIENT
Start: 2024-09-12

## 2024-09-12 NOTE — PATIENT INSTRUCTIONS
-Fosamax has been used for many years for treatment of osteoporosis.   -taken once weekly  -taken with a glass of water on an empty stomach :  avoid lying down or eating for 30 minutes (this decreases the likelihood of irritation of the stomach lining from the medication)  -we discussed a very rare complication of medications like Fosamax is osteonecrosis of the jaw and atypical fractures of the thigh.  The osteonecrosis is seen most commonly in people who have other serious medical conditions in addition to their osteopenia or osteoporosis.  It is incredibly unlikely to happen in a healthy postmenopausal woman.  The fractures were seen in women taking the medication for longer than 3-5 years.   -in addition to Fosamax, you should aim to get 1200 to 1500 mg of calcium a day and 800 to 1000 IU of Vitamin D a day.    -Weight bearing exercise such as walking helps preserve bone and delay further bone loss.    -Fosamax may actually reverse the bone loss.    -plan for repeat DEXA in 2 years    Reclast (IV bisphosphonate) infusion every 12 months. Occurs at [facility] infusion center  : call  to set up your infusion appointment.

## 2024-09-12 NOTE — PROGRESS NOTES
SUBJECTIVE:    Jane Vazquez, is a 64 year old female presenting for the below:     New Dx of osteoporosis based on DEXA 9/9/24.  No Fhx. Remoted h/o smoking (quit at age 24). S/p hysterectomy around late 30s : ovaries intact. Does not recall any perimenopausal symptoms so unaware of age of menopause.     OBJECTIVE:  Vitals:    09/12/24 1115   BP: 131/84   Pulse: 61   SpO2: 100%   Weight: 53.5 kg (118 lb)    Body mass index is 21.93 kg/m .  General: no acute distress, cooperative with exam.  Psych: mental status normal, mood and affect appropriate.    Estimated Creatinine Clearance: 55.1 mL/min (based on SCr of 0.86 mg/dL).    ASSESSMENT / PLAN:      Osteoporosis, unspecified osteoporosis type, unspecified pathological fracture presence  Discussed diagnosis of osteoporosis (aetiology and natural progression).    No active gastritis, peptic ulcer disease, GERD.  Discussed fosamax in more detail : Mechanism of action, common side effects and how to take discussed     Discussed and issued on AVS:  -Fosamax has been used for many years for treatment of osteoporosis.   -taken once weekly  -taken with a glass of water on an empty stomach :  avoid lying down or eating for 30 minutes (this decreases the likelihood of irritation of the stomach lining from the medication)  -we discussed a very rare complication of medications like Fosamax is osteonecrosis of the jaw and atypical fractures of the thigh.  The osteonecrosis is seen most commonly in people who have other serious medical conditions in addition to their osteopenia or osteoporosis.  It is incredibly unlikely to happen in a healthy postmenopausal woman.  The fractures were seen in women taking the medication for longer than 3-5 years.   -in addition to Fosamax, you should aim to get 1200 to 1500 mg of calcium a day and 800 to 1000 IU of Vitamin D a day.    -Weight bearing exercise such as walking helps preserve bone and delay further bone loss.    -Fosamax may  actually reverse the bone loss.    -plan for repeat DEXA in 2 years    Reclast (IV bisphosphonate) infusion every 12 months. Occurs at [facility] infusion center  : call  to set up your infusion appointment.     -     alendronate (FOSAMAX) 70 MG tablet; Take 1 tablet (70 mg) by mouth every 7 days.    Mixed hyperlipidemia  . ASCVD 5.9%  Patient opts to start taking statin for risk reduction.   The 10-year ASCVD risk score (Barbi GUILLEN, et al., 2019) is:   -     rosuvastatin (CRESTOR) 20 MG tablet; Take 1 tablet (20 mg) by mouth daily.

## 2025-02-24 ENCOUNTER — OFFICE VISIT (OUTPATIENT)
Dept: FAMILY MEDICINE | Facility: CLINIC | Age: 65
End: 2025-02-24

## 2025-02-24 VITALS
BODY MASS INDEX: 22.41 KG/M2 | SYSTOLIC BLOOD PRESSURE: 160 MMHG | HEIGHT: 62 IN | OXYGEN SATURATION: 99 % | DIASTOLIC BLOOD PRESSURE: 100 MMHG | HEART RATE: 76 BPM | WEIGHT: 121.8 LBS

## 2025-02-24 DIAGNOSIS — K51.00 ULCERATIVE PANCOLITIS WITHOUT COMPLICATION (H): ICD-10-CM

## 2025-02-24 DIAGNOSIS — I10 PRIMARY HYPERTENSION: Primary | ICD-10-CM

## 2025-02-24 LAB
% GRANULOCYTES: 70.2 % (ref 42.2–75.2)
ALBUMIN SERPL BCG-MCNC: 4.7 G/DL (ref 3.5–5.2)
ALP SERPL-CCNC: 73 U/L (ref 40–150)
ALT SERPL W P-5'-P-CCNC: 20 U/L (ref 0–50)
ANION GAP SERPL CALCULATED.3IONS-SCNC: 13 MMOL/L (ref 7–15)
AST SERPL W P-5'-P-CCNC: 32 U/L (ref 0–45)
BILIRUB SERPL-MCNC: 0.3 MG/DL
BUN SERPL-MCNC: 16.6 MG/DL (ref 8–23)
CALCIUM SERPL-MCNC: 10.2 MG/DL (ref 8.8–10.4)
CHLORIDE SERPL-SCNC: 104 MMOL/L (ref 98–107)
CREAT SERPL-MCNC: 0.85 MG/DL (ref 0.51–0.95)
EGFRCR SERPLBLD CKD-EPI 2021: 76 ML/MIN/1.73M2
FASTING STATUS PATIENT QL REPORTED: NORMAL
GLUCOSE SERPL-MCNC: 96 MG/DL (ref 70–99)
HCO3 SERPL-SCNC: 27 MMOL/L (ref 22–29)
HCT VFR BLD AUTO: 40.8 % (ref 35–46)
HEMOGLOBIN: 13.7 G/DL (ref 11.8–15.5)
LYMPHOCYTES NFR BLD AUTO: 23.3 % (ref 20.5–51.1)
MCH RBC QN AUTO: 30.8 PG (ref 27–31)
MCHC RBC AUTO-ENTMCNC: 33.5 G/DL (ref 33–37)
MCV RBC AUTO: 91.8 FL (ref 80–100)
MONOCYTES NFR BLD AUTO: 6.5 % (ref 1.7–9.3)
PLATELET # BLD AUTO: 242 K/UL (ref 140–450)
POTASSIUM SERPL-SCNC: 4.1 MMOL/L (ref 3.4–5.3)
PROT SERPL-MCNC: 7.3 G/DL (ref 6.4–8.3)
RBC # BLD AUTO: 4.44 X10/CMM (ref 3.7–5.2)
SODIUM SERPL-SCNC: 144 MMOL/L (ref 135–145)
TSH SERPL DL<=0.005 MIU/L-ACNC: 4.59 UIU/ML (ref 0.3–4.2)
WBC # BLD AUTO: 6 X10/CMM (ref 3.8–11)

## 2025-02-24 PROCEDURE — 36415 COLL VENOUS BLD VENIPUNCTURE: CPT | Performed by: FAMILY MEDICINE

## 2025-02-24 PROCEDURE — 80053 COMPREHEN METABOLIC PANEL: CPT | Mod: ORL | Performed by: FAMILY MEDICINE

## 2025-02-24 PROCEDURE — G2211 COMPLEX E/M VISIT ADD ON: HCPCS | Performed by: FAMILY MEDICINE

## 2025-02-24 PROCEDURE — 99214 OFFICE O/P EST MOD 30 MIN: CPT | Performed by: FAMILY MEDICINE

## 2025-02-24 PROCEDURE — 84443 ASSAY THYROID STIM HORMONE: CPT | Mod: ORL | Performed by: FAMILY MEDICINE

## 2025-02-24 PROCEDURE — 85025 COMPLETE CBC W/AUTO DIFF WBC: CPT | Performed by: FAMILY MEDICINE

## 2025-02-24 RX ORDER — LISINOPRIL 10 MG/1
10 TABLET ORAL DAILY
Qty: 90 TABLET | Refills: 1 | Status: SHIPPED | OUTPATIENT
Start: 2025-02-24

## 2025-02-24 NOTE — PROGRESS NOTES
"Headache and htn over the past 4-5 days   Inititally by 140/85/  Non smoker   Rare etoh  Walks 10 k  ROS:  General and Resp. completed and negative except as noted above    Histories: reviewed    OBJECTIVE:                                                    BP (!) 160/100   Pulse 76   Ht 1.57 m (5' 1.8\")   Wt 55.2 kg (121 lb 12.8 oz)   SpO2 99%   BMI 22.42 kg/m    Body mass index is 22.42 kg/m .   APPEARANCE: = Relaxed and in no distress  Ear canals and TM's = Canals are not inflammed and have none or little wax and the drums are not injected and have a light reflex   Lips/Teeth/Gums = No lesions seen, good dentition, and gums seem healthy  Oropharynx = No leukoplakia, No injection to the tissues, Normal Uvula  Neck = No anterior or posterior adenopathy appreciated.  Thyroid = Not enlarged and no masses felt  Resp effort = Calm regular breathing  Breath Sounds = Good air movement with no rales or rhonchi in any lung fields  Heart Rate, Rhythm, & sounds (no Murm)  = Regular rate and rhythm with no S3, S4, or murmur appreciated.  Carotid Art's = Pulses full and equal and no bruits appreciated  Abdomen = Soft, nontender, no masses, & bowel sounds in all quadrants  Ext (edema) = No pretibial edema noted or elsewhere     ASSESSMENT/PLAN:                                                    Quality gaps reviewed     was seen today for hypertension.    Diagnoses and all orders for this visit:    Primary hypertension  Reviewed her current HTN management. Discussed our goal for her is a systolic pressure at or below 128 and diastolic pressure at or below 83.  We today managed her prescriptions with refills ensured to ensure availabilty of current medications.  Discussed the importance for aggressive management of HTN to prevent vascular complications later.  Recommended lower fat, lower carbohydrate, and lower sodium (<2000 mg)diet. Required intervals for follow up on HTN, lab studies reviewed.    Strongly recommened " she follow her blood pressures outside the clinic to ensure that BPs are remaining within guidelines,.  Instructed to contact me if the readings are not within guidelines on a regular basis so we can adjust treatment as needed.    -     CBC with Diff/Plt ([facility])  -     Comprehensive metabolic panel; Future  -     TSH; Future  -     lisinopril (ZESTRIL) 10 MG tablet; Take 1 tablet (10 mg) by mouth daily.    Ulcerative pancolitis without complication (H)  No recent problems. Just noted.    The longitudinal plan of care for the diagnosis(es)/condition(s) as documented were addressed during this visit. Due to the added complexity in care, I will continue to support  in the subsequent management and with ongoing continuity of care.    Regular exercise    Health maintenance items are reviewed in Epic and correct as of today:    Hugh Marina MD  [facility]  Family Practice  [facility]  [phone]    For any issues my office # is [phone]        Answers submitted by the patient for this visit:  General Questionnaire (Submitted on 2/24/2025)  Chief Complaint: Chronic problems general questions HPI Form  How many days per week do you miss taking your medication?: 1  General Concern (Submitted on 2/24/2025)  Chief Complaint: Chronic problems general questions HPI Form  What is the reason for your visit today?: blood pressure seems high  When did your symptoms begin?: 3-7 days ago  What are your symptoms?: checked at home  headache  Questionnaire about: Chronic problems general questions HPI Form (Submitted on 2/24/2025)  Chief Complaint: Chronic problems general questions HPI Form

## 2025-02-25 ENCOUNTER — TELEPHONE (OUTPATIENT)
Dept: FAMILY MEDICINE | Facility: CLINIC | Age: 65
End: 2025-02-25

## 2025-02-25 NOTE — TELEPHONE ENCOUNTER
----- Message from Hugh Marina sent at 2/25/2025  8:00 AM CST -----  I wanted her to follow up with Dr. Caldwell in a 10-14 days due to headache with new htn.  Can you call her to set up?    Dear ,     Nice to see you yesterday.  Labs look pretty good.  TSH slightly high so I would try and take that dose every day.      Hugh Marina MD

## 2025-02-25 NOTE — RESULT ENCOUNTER NOTE
I wanted her to follow up with Dr. Caldwell in a 10-14 days due to headache with new htn.  Can you call her to set up?    Dear ,     Nice to see you yesterday.  Labs look pretty good.  TSH slightly high so I would try and take that dose every day.      Hugh Marina MD

## 2025-03-06 ENCOUNTER — OFFICE VISIT (OUTPATIENT)
Dept: FAMILY MEDICINE | Facility: CLINIC | Age: 65
End: 2025-03-06

## 2025-03-06 VITALS
DIASTOLIC BLOOD PRESSURE: 85 MMHG | SYSTOLIC BLOOD PRESSURE: 124 MMHG | OXYGEN SATURATION: 98 % | HEART RATE: 82 BPM | WEIGHT: 121 LBS | BODY MASS INDEX: 22.27 KG/M2

## 2025-03-06 DIAGNOSIS — I10 PRIMARY HYPERTENSION: ICD-10-CM

## 2025-03-06 DIAGNOSIS — R09.89 LABILE BLOOD PRESSURE: Primary | ICD-10-CM

## 2025-03-06 PROCEDURE — 99213 OFFICE O/P EST LOW 20 MIN: CPT | Performed by: FAMILY MEDICINE

## 2025-03-06 PROCEDURE — 3079F DIAST BP 80-89 MM HG: CPT | Performed by: FAMILY MEDICINE

## 2025-03-06 PROCEDURE — 3074F SYST BP LT 130 MM HG: CPT | Performed by: FAMILY MEDICINE

## 2025-03-06 PROCEDURE — G2211 COMPLEX E/M VISIT ADD ON: HCPCS | Performed by: FAMILY MEDICINE

## 2025-03-06 NOTE — PROGRESS NOTES
SUBJECTIVE:    Jane Vazquez, is a 64 year old female presenting for the below:     -hypertension. Home blood pressure reading in range 145-90. Normotensive today. Continues lisinopril 10 mg daily. Is unsure if home blood pressure monitor is calibrated correctly. Does not have with her today.     OBJECTIVE:  Vitals:    03/06/25 1027   BP: 124/85   Pulse: 82   SpO2: 98%   Weight: 54.9 kg (121 lb)    Body mass index is 22.27 kg/m .  General: no acute distress, cooperative with exam.  Psych: mental status normal, mood and affect appropriate.    BP Readings from Last 6 Encounters:   03/06/25 124/85   02/24/25 (!) 160/100   09/12/24 131/84   09/09/24 (!) 147/85   08/08/24 115/63   12/21/23 (!) 152/72     ASSESSMENT / PLAN:        Primary hypertension  Labile blood pressure  -continue lisinopril 10 mg daily.   -shared decision for 24 hour ambulatory blood pressure monitoring : will make next step blood pressure management decision based on 24 hour average results.   -Patient expressed understanding and agreement with the plan.    -     Cancel: 24 Hour Blood Pressure Monitor - Adult; Future  -     24 Hour Blood Pressure Monitor - Adult    The longitudinal plan of care for the diagnosis(es)/condition(s) as documented were addressed during this visit. Due to the added complexity in care, I will continue to support  in the subsequent management and with ongoing continuity of care.

## 2025-03-18 VITALS — DIASTOLIC BLOOD PRESSURE: 88 MMHG | SYSTOLIC BLOOD PRESSURE: 156 MMHG

## 2025-03-18 PROCEDURE — 3079F DIAST BP 80-89 MM HG: CPT

## 2025-03-18 PROCEDURE — 3077F SYST BP >= 140 MM HG: CPT

## 2025-03-20 ENCOUNTER — OFFICE VISIT (OUTPATIENT)
Dept: FAMILY MEDICINE | Facility: CLINIC | Age: 65
End: 2025-03-20

## 2025-03-20 VITALS
BODY MASS INDEX: 22.13 KG/M2 | SYSTOLIC BLOOD PRESSURE: 150 MMHG | OXYGEN SATURATION: 100 % | HEART RATE: 77 BPM | DIASTOLIC BLOOD PRESSURE: 73 MMHG | WEIGHT: 120.2 LBS

## 2025-03-20 DIAGNOSIS — R05.1 ACUTE COUGH: Primary | ICD-10-CM

## 2025-03-20 DIAGNOSIS — I10 BENIGN ESSENTIAL HYPERTENSION: ICD-10-CM

## 2025-03-20 DIAGNOSIS — R22.1 MASS OF NECK: ICD-10-CM

## 2025-03-20 PROCEDURE — 3078F DIAST BP <80 MM HG: CPT | Performed by: STUDENT IN AN ORGANIZED HEALTH CARE EDUCATION/TRAINING PROGRAM

## 2025-03-20 PROCEDURE — 99214 OFFICE O/P EST MOD 30 MIN: CPT | Performed by: STUDENT IN AN ORGANIZED HEALTH CARE EDUCATION/TRAINING PROGRAM

## 2025-03-20 PROCEDURE — 3074F SYST BP LT 130 MM HG: CPT | Performed by: STUDENT IN AN ORGANIZED HEALTH CARE EDUCATION/TRAINING PROGRAM

## 2025-03-20 RX ORDER — LOSARTAN POTASSIUM 25 MG/1
25 TABLET ORAL DAILY
Qty: 30 TABLET | Refills: 0 | Status: SHIPPED | OUTPATIENT
Start: 2025-03-20 | End: 2025-04-19

## 2025-03-20 RX ORDER — PANTOPRAZOLE SODIUM 40 MG/1
40 TABLET, DELAYED RELEASE ORAL DAILY
Qty: 21 TABLET | Refills: 0 | Status: SHIPPED | OUTPATIENT
Start: 2025-03-20 | End: 2025-04-10

## 2025-03-20 ASSESSMENT — ENCOUNTER SYMPTOMS: COUGH: 1

## 2025-03-20 NOTE — PROGRESS NOTES
Assessment & Plan   Assessment & Plan  Acute cough  -two weeks  -stopped lisinopril 1 week ago, no improvement  -discussed possible causes of cough   -has history of GERD  -will try 3 weeks of pantoprazole, if helpful, will try 3 more weeks before trialing off  Orders:    pantoprazole (PROTONIX) 40 MG EC tablet; Take 1 tablet (40 mg) by mouth daily for 21 days.    Mass of neck  -patient is concerned about lump that she notices on back of neck  -worse in the morning, firm  -on exam today, hard and nonmobile, possibly bone?  -will order US  Orders:    US Head Neck Soft Tissue; Future    Benign essential hypertension  -/73 mmHg today, right after 96/58 mmHg  -labile  -dry cough, stopped lisinopril  -will start on losartan, see if stops high values and no SE  Orders:    losartan (COZAAR) 25 MG tablet; Take 1 tablet (25 mg) by mouth daily.       Return in about 4 weeks (around 4/17/2025) for Follow up.    Subjective    is a 64 year old, presenting for the following health issues:  Cough (Persistent dry cough /Stopped taking lisinopril last Thursday (3/13) which has not helped so far /) and Hypertension (BP has been high (above 140s/90s) since stopping Lisinopril and been all over the place (also did a recent 24 hour BP monitor) )    History of Present Illness       Hypertension: She presents for follow up of hypertension.  She does check blood pressure  regularly outside of the clinic. Outside blood pressures have been over 140/90. She follows a low salt diet.     She eats 2-3 servings of fruits and vegetables daily.She consumes 0 sweetened beverage(s) daily. She exercises with enough effort to increase her heart rate 6 days per week.   She is taking medications regularly.     Patient states that she was recently started on lisinopril almost a month ago for labile blood pressure. Patient began experiencing a dry cough a week into starting the medication. Her cough is during the day and at night. Patient is  having trouble sleeping due to cough, waking up every 2-3 hours from coughing. Her family is mentioning the cough.     Patient had her blood pressure cuff calibrated and at home gets labile BP with elevations being 140-150/90s.    Patient is concerned on lump on back of neck.    Review of Systems   Respiratory:  Positive for cough.           Objective    BP (!) 150/73   Pulse 77   Wt 54.5 kg (120 lb 3.2 oz)   SpO2 100%   BMI 22.13 kg/m    Body mass index is 22.13 kg/m .  Physical Exam  Constitutional:       General: She is not in acute distress.     Appearance: Normal appearance.   HENT:      Head: Normocephalic and atraumatic.   Eyes:      Conjunctiva/sclera: Conjunctivae normal.   Neck:        Comments: Firm, nonmobile mass  Cardiovascular:      Rate and Rhythm: Normal rate and regular rhythm.      Pulses: Normal pulses.      Heart sounds: Normal heart sounds.   Pulmonary:      Effort: Pulmonary effort is normal. No respiratory distress.      Breath sounds: Normal breath sounds.   Skin:     General: Skin is warm and dry.   Neurological:      Mental Status: She is alert.   Psychiatric:         Thought Content: Thought content normal.        Signed Electronically by: Rocío Lozano DO

## 2025-03-25 ENCOUNTER — ANCILLARY PROCEDURE (OUTPATIENT)
Dept: ULTRASOUND IMAGING | Facility: CLINIC | Age: 65
End: 2025-03-25
Attending: STUDENT IN AN ORGANIZED HEALTH CARE EDUCATION/TRAINING PROGRAM
Payer: MEDICARE

## 2025-03-25 DIAGNOSIS — R22.1 MASS OF NECK: ICD-10-CM

## 2025-03-25 PROCEDURE — 76536 US EXAM OF HEAD AND NECK: CPT

## 2025-04-14 ENCOUNTER — MYC REFILL (OUTPATIENT)
Dept: FAMILY MEDICINE | Facility: CLINIC | Age: 65
End: 2025-04-14

## 2025-04-14 DIAGNOSIS — R05.1 ACUTE COUGH: ICD-10-CM

## 2025-04-14 DIAGNOSIS — E03.9 ACQUIRED HYPOTHYROIDISM: ICD-10-CM

## 2025-04-14 RX ORDER — LEVOTHYROXINE SODIUM 125 UG/1
TABLET ORAL
Qty: 90 TABLET | Refills: 3 | Status: CANCELLED | OUTPATIENT
Start: 2025-04-14

## 2025-04-14 RX ORDER — LEVOTHYROXINE SODIUM 125 UG/1
TABLET ORAL
Qty: 90 TABLET | Refills: 3 | Status: SHIPPED | OUTPATIENT
Start: 2025-04-14

## 2025-04-14 RX ORDER — PANTOPRAZOLE SODIUM 40 MG/1
40 TABLET, DELAYED RELEASE ORAL DAILY
Qty: 21 TABLET | Refills: 0 | Status: SHIPPED | OUTPATIENT
Start: 2025-04-14

## 2025-04-14 NOTE — TELEPHONE ENCOUNTER
Med: I wanted her to follow up with Dr. Caldwell in a 10-14 days due to headache with new htn.  Can you call her to set up?     Dear ,     José to see you yesterday.  Labs look pretty good.  TSH slightly high so I would try and take that dose every day.        Hugh Marina MD    LOV (related): 2/24/25    Last Lab: 2/24/25      Due for F/U around: none noted - abn thyroid    Next Appt: none        TSH   Date Value Ref Range Status   02/24/2025 4.59 (H) 0.30 - 4.20 uIU/mL Final     I wanted her to follow up with Dr. Caldwell in a 10-14 days due to headache with new htn.  Can you call her to set up?     Dear ,     José to see you yesterday.  Labs look pretty good.  TSH slightly high so I would try and take that dose every day.        Hugh Marina MD

## 2025-06-24 ENCOUNTER — OFFICE VISIT (OUTPATIENT)
Dept: FAMILY MEDICINE | Facility: CLINIC | Age: 65
End: 2025-06-24

## 2025-06-24 ENCOUNTER — RESULTS FOLLOW-UP (OUTPATIENT)
Dept: FAMILY MEDICINE | Facility: CLINIC | Age: 65
End: 2025-06-24

## 2025-06-24 VITALS
BODY MASS INDEX: 20.83 KG/M2 | HEIGHT: 62 IN | SYSTOLIC BLOOD PRESSURE: 137 MMHG | WEIGHT: 113.2 LBS | OXYGEN SATURATION: 100 % | DIASTOLIC BLOOD PRESSURE: 68 MMHG | HEART RATE: 83 BPM

## 2025-06-24 DIAGNOSIS — R63.4 WEIGHT LOSS: ICD-10-CM

## 2025-06-24 DIAGNOSIS — R19.4 CHANGE IN BOWEL HABIT: ICD-10-CM

## 2025-06-24 DIAGNOSIS — Z87.19 HISTORY OF ULCERATIVE COLITIS: Primary | ICD-10-CM

## 2025-06-24 LAB
% GRANULOCYTES: 62.7 % (ref 42.2–75.2)
ANION GAP SERPL CALCULATED.3IONS-SCNC: 8 MMOL/L (ref 7–15)
BUN SERPL-MCNC: 16.8 MG/DL (ref 8–23)
CALCIUM SERPL-MCNC: 9.9 MG/DL (ref 8.8–10.4)
CHLORIDE SERPL-SCNC: 104 MMOL/L (ref 98–107)
CREAT SERPL-MCNC: 0.89 MG/DL (ref 0.51–0.95)
EGFRCR SERPLBLD CKD-EPI 2021: 71 ML/MIN/1.73M2
FASTING STATUS PATIENT QL REPORTED: NO
GLUCOSE SERPL-MCNC: 98 MG/DL (ref 70–99)
HCO3 SERPL-SCNC: 27 MMOL/L (ref 22–29)
HCT VFR BLD AUTO: 38.2 % (ref 35–46)
HEMOGLOBIN: 13.5 G/DL (ref 11.8–15.5)
LYMPHOCYTES NFR BLD AUTO: 30.7 % (ref 20.5–51.1)
MCH RBC QN AUTO: 31.6 PG (ref 27–31)
MCHC RBC AUTO-ENTMCNC: 35.5 G/DL (ref 33–37)
MCV RBC AUTO: 88.9 FL (ref 80–100)
MONOCYTES NFR BLD AUTO: 6.6 % (ref 1.7–9.3)
PLATELET # BLD AUTO: 249 K/UL (ref 140–450)
POTASSIUM SERPL-SCNC: 4.1 MMOL/L (ref 3.4–5.3)
RBC # BLD AUTO: 4.29 X10/CMM (ref 3.7–5.2)
SODIUM SERPL-SCNC: 139 MMOL/L (ref 135–145)
WBC # BLD AUTO: 5.8 X10/CMM (ref 3.8–11)

## 2025-06-24 PROCEDURE — 3075F SYST BP GE 130 - 139MM HG: CPT | Performed by: FAMILY MEDICINE

## 2025-06-24 PROCEDURE — G2211 COMPLEX E/M VISIT ADD ON: HCPCS | Performed by: FAMILY MEDICINE

## 2025-06-24 PROCEDURE — 3078F DIAST BP <80 MM HG: CPT | Performed by: FAMILY MEDICINE

## 2025-06-24 PROCEDURE — 99213 OFFICE O/P EST LOW 20 MIN: CPT | Performed by: FAMILY MEDICINE

## 2025-06-24 PROCEDURE — 80048 BASIC METABOLIC PNL TOTAL CA: CPT | Mod: ORL | Performed by: FAMILY MEDICINE

## 2025-06-24 PROCEDURE — 85025 COMPLETE CBC W/AUTO DIFF WBC: CPT | Performed by: FAMILY MEDICINE

## 2025-06-24 PROCEDURE — 36415 COLL VENOUS BLD VENIPUNCTURE: CPT | Performed by: FAMILY MEDICINE

## 2025-06-24 NOTE — PROGRESS NOTES
"SUBJECTIVE:    Jane Vazquez, is a 65 year old female presenting for the below:     \"My stomach is never right\". Ongoing since spring. Alternates between constipation, loose stool, bloating and gas.     Took 1 injection (1.5 mg ) of tirzepatide issued to her by daughter 2.5 weeks ago. Resultant decreased appetite and constant nausea. Has not taken any further injections, but with continued reduced appetite.     2 daughters with celiac and 1 gluten intolerant.     Hx of ulcerative colitis : Dx at 23. Last flare 5 years ago.     Denies rectal bleeding / mucous in stool, stomach cramping or pains. Weight loss of around 8 lbs in last few weeks but attributes this to taking daughter's tirzepatide).    Last colonoscopy. : June 2020.      OBJECTIVE:  Vitals:    06/24/25 1415   BP: 137/68   Pulse: 83   SpO2: 100%   Weight: 51.3 kg (113 lb 3.2 oz)   Height: 1.568 m (5' 1.75\")    Body mass index is 20.87 kg/m .  General: no acute distress, cooperative with exam, non toxic appearing.   Lungs: clear to auscultation bilaterally, normal respiratory effort.  Heart: regular rate, normal S1 and S2, no murmurs.   Abdomen: normal bowel sounds, nontender, no palpable organomegaly.    ASSESSMENT / PLAN:      History of ulcerative colitis  Change in bowel habit  Weight loss  Course of symptoms somewhat confounded by pt taking 1 x injection of daughters Tirzepatide medication (discussed risks of this) which likely accounts for recent weight loss and nausea, but experiencing bowel habit change more chronically with h/o ulcerative colitis. Discussed importance of re establishing with GI. Referral made.   -     Adult GI  Referral - Consult Only - To a [facility] Location (Use POS/Location); Future  -     CBC with Diff/Plt ([facility])  -     Basic metabolic panel; Future      The longitudinal plan of care for the diagnosis(es)/condition(s) as documented were addressed during this visit. Due to the added complexity in care, I " will continue to support  in the subsequent management and with ongoing continuity of care.

## 2025-07-01 ENCOUNTER — TRANSFERRED RECORDS (OUTPATIENT)
Dept: FAMILY MEDICINE | Facility: CLINIC | Age: 65
End: 2025-07-01

## 2025-08-07 ENCOUNTER — TRANSFERRED RECORDS (OUTPATIENT)
Dept: FAMILY MEDICINE | Facility: CLINIC | Age: 65
End: 2025-08-07

## 2025-08-15 ENCOUNTER — TRANSFERRED RECORDS (OUTPATIENT)
Dept: FAMILY MEDICINE | Facility: CLINIC | Age: 65
End: 2025-08-15